# Patient Record
Sex: MALE | Race: WHITE | NOT HISPANIC OR LATINO | Employment: UNEMPLOYED | URBAN - NONMETROPOLITAN AREA
[De-identification: names, ages, dates, MRNs, and addresses within clinical notes are randomized per-mention and may not be internally consistent; named-entity substitution may affect disease eponyms.]

---
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
## 2024-10-12 ENCOUNTER — APPOINTMENT (OUTPATIENT)
Dept: GENERAL RADIOLOGY | Facility: HOSPITAL | Age: 19
End: 2024-10-12

## 2024-10-12 ENCOUNTER — HOSPITAL ENCOUNTER (EMERGENCY)
Facility: HOSPITAL | Age: 19
Discharge: HOME | End: 2024-10-12

## 2024-10-12 VITALS
HEIGHT: 69 IN | SYSTOLIC BLOOD PRESSURE: 137 MMHG | RESPIRATION RATE: 18 BRPM | TEMPERATURE: 97.9 F | OXYGEN SATURATION: 99 % | WEIGHT: 195 LBS | DIASTOLIC BLOOD PRESSURE: 86 MMHG | BODY MASS INDEX: 28.88 KG/M2 | HEART RATE: 86 BPM

## 2024-10-12 DIAGNOSIS — M54.9 BACK PAIN, UNSPECIFIED BACK LOCATION, UNSPECIFIED BACK PAIN LATERALITY, UNSPECIFIED CHRONICITY: Primary | ICD-10-CM

## 2024-10-12 PROCEDURE — 96372 THER/PROPH/DIAG INJ SC/IM: CPT

## 2024-10-12 PROCEDURE — 72110 X-RAY EXAM L-2 SPINE 4/>VWS: CPT

## 2024-10-12 PROCEDURE — 72072 X-RAY EXAM THORAC SPINE 3VWS: CPT

## 2024-10-12 PROCEDURE — 99283 EMERGENCY DEPT VISIT LOW MDM: CPT

## 2024-10-12 PROCEDURE — 71101 X-RAY EXAM UNILAT RIBS/CHEST: CPT

## 2024-10-12 PROCEDURE — 25010000002 KETOROLAC TROMETHAMINE PER 15 MG

## 2024-10-12 RX ORDER — IBUPROFEN 800 MG/1
800 TABLET, FILM COATED ORAL EVERY 8 HOURS PRN
Qty: 12 TABLET | Refills: 0 | Status: SHIPPED | OUTPATIENT
Start: 2024-10-12 | End: 2024-10-16

## 2024-10-12 RX ORDER — CYCLOBENZAPRINE HCL 5 MG
5 TABLET ORAL 3 TIMES DAILY PRN
Qty: 9 TABLET | Refills: 0 | Status: SHIPPED | OUTPATIENT
Start: 2024-10-12 | End: 2024-10-15

## 2024-10-12 RX ORDER — CYCLOBENZAPRINE HCL 10 MG
10 TABLET ORAL ONCE
Status: COMPLETED | OUTPATIENT
Start: 2024-10-12 | End: 2024-10-12

## 2024-10-12 RX ORDER — KETOROLAC TROMETHAMINE 30 MG/ML
30 INJECTION, SOLUTION INTRAMUSCULAR; INTRAVENOUS ONCE
Status: COMPLETED | OUTPATIENT
Start: 2024-10-12 | End: 2024-10-12

## 2024-10-12 RX ADMIN — KETOROLAC TROMETHAMINE 30 MG: 30 INJECTION, SOLUTION INTRAMUSCULAR; INTRAVENOUS at 17:23

## 2024-10-12 RX ADMIN — CYCLOBENZAPRINE HYDROCHLORIDE 10 MG: 10 TABLET, FILM COATED ORAL at 17:23

## 2024-10-12 NOTE — ED PROVIDER NOTES
Subjective   History of Present Illness  Patient is a 18-year-old male who presents emergency department complaints of back pain.  Reports that onset was 4 days ago after he coughed after smoking.  Patient reports that he is having pain to his midline/right lumbar region.  He is also having pain in the thoracic region of his back in the midline that radiates to his posterior right ribs.  Patient reports that he does have some difficulty sitting up due to the pain.  He is currently lying flat on his abdomen on exam.  No bruising or traumatic findings noted on exam.  Patient with history of scoliosis.        Review of Systems   Musculoskeletal:  Positive for back pain.   All other systems reviewed and are negative.      No past medical history on file.    No Known Allergies    Past Surgical History:   Procedure Laterality Date    ADENOIDECTOMY      TONSILLECTOMY      TYMPANOSTOMY TUBE PLACEMENT         No family history on file.    Social History     Socioeconomic History    Marital status: Single   Tobacco Use    Smoking status: Passive Smoke Exposure - Never Smoker           Objective   Physical Exam  Vitals and nursing note reviewed.   Constitutional:       General: He is not in acute distress.     Appearance: Normal appearance. He is normal weight. He is not ill-appearing or toxic-appearing.   HENT:      Head: Normocephalic.      Nose: Nose normal.      Mouth/Throat:      Mouth: Mucous membranes are moist.      Pharynx: No oropharyngeal exudate or posterior oropharyngeal erythema.      Comments: Airway patent  Cardiovascular:      Rate and Rhythm: Normal rate and regular rhythm.      Pulses: Normal pulses.      Heart sounds: Normal heart sounds.   Pulmonary:      Effort: Pulmonary effort is normal.      Breath sounds: Normal breath sounds.   Abdominal:      General: Abdomen is flat. Bowel sounds are normal. There is no distension.      Palpations: Abdomen is soft.      Tenderness: There is no abdominal tenderness.    Musculoskeletal:         General: Normal range of motion.      Cervical back: Normal range of motion and neck supple.   Skin:     General: Skin is warm and dry.   Neurological:      General: No focal deficit present.      Mental Status: He is alert and oriented to person, place, and time. Mental status is at baseline.   Psychiatric:         Mood and Affect: Mood normal.         Behavior: Behavior normal.         Thought Content: Thought content normal.         Judgment: Judgment normal.         Procedures       XR Ribs Right With PA Chest   Final Result   1. No evidence of displaced rib fracture or pneumothorax.           This report was signed and finalized on 10/12/2024 5:27 PM by Robert Maya.          XR Spine Thoracic 3 View   Final Result   1. Mild dextroscoliosis of the thoracic spine. No acute osseous   abnormality is seen.       This report was signed and finalized on 10/12/2024 5:29 PM by Robert Maya.          XR Spine Lumbar Complete 4+VW   Final Result       1. Levoscoliosis and otherwise unremarkable exam.       This report was signed and finalized on 10/12/2024 5:29 PM by Robert Maya.                  ED Course                                             Medical Decision Making  Patient is a 18-year-old male who presents emergency department complaints of back pain.  Reports that onset was 4 days ago after he coughed after smoking.  Patient reports that he is having pain to his midline/right lumbar region.  He is also having pain in the thoracic region of his back in the midline that radiates to his posterior right ribs.  Patient reports that he does have some difficulty sitting up due to the pain.  He is currently lying flat on his abdomen on exam.  No bruising or traumatic findings noted on exam.  Patient with history of scoliosis.    Patient was non-toxic appearing on arrival. Vital signs stable.     Patient's presentation raises suspicion for differentials including, but not  limited to, muscle strain, rib fracture, dislocation.     External (non-ED) record review: None    Given this, imaging studies were ordered including x-ray lumbar spine, x-ray thoracic spine, x-ray right ribs.    Patient was given IM Toradol, Flexeril for symptomatic relief.    Imaging was reviewed by radiologist. Please refer to above section for results that were interpreted by radiologist.    On re-evaluation, patient remained hemodynamically stable and appeared to be comfortable and in no acute distress.    Given findings described above, patient's presentation is most likely related to muscle strain. At this point, after reviewing the workup, I have a low suspicion for back fracture, dislocation, rib fracture.  Have encouraged heat and ice therapy.    I discussed all of the imaging results with the patient during this visit in the emergency department. I answered all the questions regarding the emergency department evaluation, diagnosis, and treatment plan. We talked about how crucial it is for the patient to follow up by calling their primary care provider as soon as possible to schedule an appointment for within the next few days or as soon as possible so that the symptoms can be reassessed to see if they have improved or to answer any additional questions. I also provided the patient with advice on returning safely and urged the patient to visit the emergency department right away if any worsening or new symptoms appeared. The patient verbalized understanding of the discharge instructions and agreed with them. Harman was discharged in stable condition.    Signed by:   BRADEN Damian 10/12/2024 18:19 CDT     Dragon disclaimer:  Part of this note may be an electronic transcription/translation of spoken language to printed text using the Dragon Dictation System.    Problems Addressed:  Back pain, unspecified back location, unspecified back pain laterality, unspecified chronicity: acute illness or  injury    Amount and/or Complexity of Data Reviewed  Radiology: ordered.    Risk  Prescription drug management.        Final diagnoses:   Back pain, unspecified back location, unspecified back pain laterality, unspecified chronicity       ED Disposition  ED Disposition       ED Disposition   Discharge    Condition   Stable    Comment   --               Provider, No Known  [facility] [address]    Schedule an appointment as soon as possible for a visit in 1 day      [facility] EMERGENCY DEPARTMENT  [address] [place] [id number] [phone]  Go to   If symptoms worsen         Medication List        New Prescriptions      cyclobenzaprine 5 MG tablet  Commonly known as: FLEXERIL  Take 1 tablet by mouth 3 (Three) Times a Day As Needed for Muscle Spasms for up to 3 days.     ibuprofen 800 MG tablet  Commonly known as: ADVIL,MOTRIN  Take 1 tablet by mouth Every 8 (Eight) Hours As Needed for Mild Pain or Moderate Pain for up to 4 days.               Where to Get Your Medications        These medications were sent to [id number] - [place], [place] - [address] - [phone] [facility] [phone] FX  [address] [facility] [address]      Phone: [phone]   cyclobenzaprine 5 MG tablet  ibuprofen 800 MG tablet            Azucena Lyman APRN  10/12/24 [id number]

## 2024-10-12 NOTE — DISCHARGE INSTRUCTIONS
Today you are seen in the ER for your symptoms.  Your x-rays were negative for acute findings.  Likely a muscle strain.  Please follow with PCP and return to the ER for any new or worsening symptoms.